# Patient Record
(demographics unavailable — no encounter records)

---

## 2025-02-27 NOTE — REASON FOR VISIT
Medical opinion form complete and faxed to [facility] and [facility] [phone]  Sent to be scanned into chart    Marisol Shepard    [Annual] : an annual visit.

## 2025-03-05 NOTE — HISTORY OF PRESENT ILLNESS
[FreeTextEntry1] : Pt is a , LMP 4 yrs,  natural. Pt here for annual exam no abnormal vaginal bleeding, pelvic pain or vaginal discharge. Patient on low dose HRT, secondary to bad vasomotor symptoms, medicine is helping for most part. She reports some decreased libido. Wants to switch to a patch delivery system.   Ob/Gyn Hx- c/s x 2, [id number] no hx casandra/hpv. Mammo last no [id number], had 2024. Bone density 2024 normal w/ pcp Pt has hx of fibroid uterus, last sono were decreasing in size.  Med/Surg Hx -hypothyroid, c/s x 2 [Patient reported mammogram was normal] : Patient reported mammogram was normal [Patient reported PAP Smear was normal] : Patient reported PAP Smear was normal [Patient reported bone density results were normal] : Patient reported bone density results were normal [Patient reported colonoscopy was normal] : Patient reported colonoscopy was normal [Mammogramdate] : 2024 [PapSmeardate] : 2024 [BoneDensityDate] : 2024 [ColonoscopyDate] : 2024

## 2025-03-05 NOTE — COUNSELING
[Nutrition/ Exercise/ Weight Management] : nutrition, exercise, weight management [Vitamins/Supplements] : vitamins/supplements [Breast Self Exam] : breast self exam Otezla Counseling: The side effects of Otezla were discussed with the patient, including but not limited to worsening or new depression, weight loss, diarrhea, nausea, upper respiratory tract infection, and headache. Patient instructed to call the office should any adverse effect occur.  The patient verbalized understanding of the proper use and possible adverse effects of Otezla.  All the patient's questions and concerns were addressed.

## 2025-03-05 NOTE — HISTORY OF PRESENT ILLNESS
[FreeTextEntry1] : Pt is a , LMP 4 yrs,  natural. Pt here for annual exam no abnormal vaginal bleeding, pelvic pain or vaginal discharge. Patient on low dose HRT, secondary to bad vasomotor symptoms, medicine is helping for most part. She reports some decreased libido. Wants to switch to a patch delivery system.   Ob/Gyn Hx- c/s x 2, [id number] no hx casandra/hpv. Mammo last no [id number], had 2024. Bone density 2024 normal w/ pcp Pt has hx of fibroid uterus, last sono were decreasing in size.  Med/Surg Hx -hypothyroid, c/s x 2 [Patient reported mammogram was normal] : Patient reported mammogram was normal [Patient reported PAP Smear was normal] : Patient reported PAP Smear was normal [Patient reported bone density results were normal] : Patient reported bone density results were normal [Patient reported colonoscopy was normal] : Patient reported colonoscopy was normal [Mammogramdate] : 2024 [PapSmeardate] : 2024 [BoneDensityDate] : 2024 [ColonoscopyDate] : 2024

## 2025-03-05 NOTE — PHYSICAL EXAM
[Chaperone Present] : A chaperone was present in the examining room during all aspects of the physical examination [Appropriately responsive] : appropriately responsive [Alert] : alert [No Acute Distress] : no acute distress [No Lymphadenopathy] : no lymphadenopathy [Soft] : soft [Non-tender] : non-tender [Non-distended] : non-distended [No HSM] : No HSM [No Lesions] : no lesions [No Mass] : no mass [Oriented x3] : oriented x3 [Examination Of The Breasts] : a normal appearance [No Masses] : no breast masses were palpable [Labia Majora] : normal [Labia Minora] : normal [Normal] : normal [Enlarged ___ wks] : enlarged [unfilled] ~Uweeks [Uterine Adnexae] : normal [No Tenderness] : no tenderness [Nl Sphincter Tone] : normal sphincter tone [FreeTextEntry9] : Guaiac negative

## 2025-03-05 NOTE — PLAN
[FreeTextEntry1] : Normal Exam , symptomatic menopause, decreased libido  -Pap due 2027  -Breast self exam reviewed , Mammo for 3/25  -Gc Ct sent  -Will switch to combi-patch , discussed that oral testosterone is not FDA approved for her complaints, discussed gels/topicals. off label Flibanserin   -return visit PRN or 1 year